# Patient Record
Sex: FEMALE | Race: BLACK OR AFRICAN AMERICAN | Employment: OTHER | URBAN - METROPOLITAN AREA
[De-identification: names, ages, dates, MRNs, and addresses within clinical notes are randomized per-mention and may not be internally consistent; named-entity substitution may affect disease eponyms.]

---

## 2017-01-17 ENCOUNTER — HOSPITAL ENCOUNTER (EMERGENCY)
Age: 67
Discharge: HOME | End: 2017-01-17
Attending: EMERGENCY MEDICINE
Payer: MEDICARE

## 2017-01-17 VITALS
HEIGHT: 63 IN | HEART RATE: 91 BPM | DIASTOLIC BLOOD PRESSURE: 110 MMHG | WEIGHT: 263.13 LBS | OXYGEN SATURATION: 100 % | SYSTOLIC BLOOD PRESSURE: 161 MMHG | BODY MASS INDEX: 46.62 KG/M2 | RESPIRATION RATE: 16 BRPM | TEMPERATURE: 98.6 F

## 2017-01-17 DIAGNOSIS — K04.7 DENTAL INFECTION: Primary | ICD-10-CM

## 2017-01-17 DIAGNOSIS — K08.89 PAIN, DENTAL: ICD-10-CM

## 2017-01-17 PROCEDURE — 74011250637 HC RX REV CODE- 250/637: Performed by: EMERGENCY MEDICINE

## 2017-01-17 PROCEDURE — 99282 EMERGENCY DEPT VISIT SF MDM: CPT

## 2017-01-17 RX ORDER — CLINDAMYCIN HYDROCHLORIDE 300 MG/1
300 CAPSULE ORAL 4 TIMES DAILY
Qty: 40 CAP | Refills: 0 | Status: SHIPPED | OUTPATIENT
Start: 2017-01-17 | End: 2017-01-27

## 2017-01-17 RX ORDER — CLINDAMYCIN HYDROCHLORIDE 150 MG/1
300 CAPSULE ORAL
Status: COMPLETED | OUTPATIENT
Start: 2017-01-17 | End: 2017-01-17

## 2017-01-17 RX ORDER — ACETAMINOPHEN 325 MG/1
650 TABLET ORAL ONCE
Status: COMPLETED | OUTPATIENT
Start: 2017-01-17 | End: 2017-01-17

## 2017-01-17 RX ADMIN — CLINDAMYCIN HYDROCHLORIDE 300 MG: 150 CAPSULE ORAL at 14:52

## 2017-01-17 RX ADMIN — ACETAMINOPHEN 650 MG: 325 TABLET, FILM COATED ORAL at 15:06

## 2017-01-17 NOTE — ED TRIAGE NOTES
Pt stated she has had intermittent gum pain and infection for 4 years, pt stated she does not have any teeth and she scratched her gum and then used a dirty tissue to wipe it, pt stated she has heat sweats at night, pt stated there is pus coming out, stated she needs an oral surgery , pt rambling in triage. ............... Justin Winslow

## 2017-01-17 NOTE — ED NOTES
Pt given discharge instructions, patient education, prescriptions, and follow up information. Pt states understanding. All questions answered. Pt discharged to home in private vehicle, ambulatory. Pt A/Ox4, RA, pain controlled.

## 2017-01-17 NOTE — ED PROVIDER NOTES
HPI Comments: 73 y.o. female with extensive past medical history, please see list, significant for bipolar, fibromyalgia, DM, HTN, and morbid obesity who presents from home with chief complaint of dental problem. Pt states that she has been experiencing worsening pain of the lower left gum line since 12/31/16 after she was scraping plaque off of a tooth and used a dirty tissue to wipe the area. Pt reports that she is experiencing pus draining from the area, and felt a \"pop\" last night, causing pus to drain continuously. Pt says that she delayed treatment at first because her sxs caused her to eat less, and the pt thought that this would help treat her HTN. Pt also reports some nausea, chills and a left sided HA. Pt denies experiencing fever or vomiting. There are no other acute medical concerns at this time. PCP: Farhat Helms MD    Note written by Nain Robledo. Kimberly Art, as dictated by Adelina Fulton MD 2:43 PM      The history is provided by the patient. No  was used.         Past Medical History:   Diagnosis Date    Arrhythmia      palpitations - evaluated and nothing to worry about per pt    Arthritis      fibromyalgia    Asthma     Chronic pain      osteoarthritis/?fibromyalgia    Diabetes ([address]     Gastrointestinal disorder      diverticulosis    Gastrointestinal disorder      chronic GI bleed    Hypertension     Morbid obesity ([address]     Other ill-defined conditions(799.89)      macular degeneration    Other ill-defined conditions(799.89)      phlebitis, DVTs    Other ill-defined conditions(799.89)      chronic post-menopausal vaginal bleeding    Other ill-defined conditions(799.89)      glaucoma    Other ill-defined conditions(799.89)      increased cholesterol    Other ill-defined conditions(799.89)      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 left foot    Unspecified adverse effect of anesthesia      wants light sedation       Past Surgical History:   Procedure Laterality Date    Hx tubal ligation           Family History:   Problem Relation Age of Onset    Heart Disease Mother     Obesity Mother     Heart Disease Sister     Heart Attack Sister     Heart Disease Brother     Heart Attack Maternal Uncle     Heart Disease Maternal Grandmother     Obesity Maternal Grandmother     Obesity Other     Diabetes Other     Cancer Other      ovarian    Heart Disease Other     Heart Disease Sister     Other Sister      AIDS    Heart Disease Sister     Other Sister      AIDS    Liver Disease Sister     Other Sister      aneurysm - brain       Social History     Social History    Marital status: SINGLE     Spouse name: N/A    Number of children: N/A    Years of education: N/A     Occupational History    Not on file. Social History Main Topics    Smoking status: Former Smoker     Types: Cigarettes    Smokeless tobacco: Not on file      Comment: 4-6 pack a day, quit 1972    Alcohol use 0.0 oz/week     0 Standard drinks or equivalent per week      Comment: rare    Drug use: No    Sexual activity: No     Other Topics Concern    Not on file     Social History Narrative         ALLERGIES: Penicillins; Barium sulfate; Glipizide; Iodinated contrast media - oral and iv dye; Laxative; Metformin; and Statins-hmg-coa reductase inhibitors    Review of Systems   Constitutional: Positive for chills. Negative for fever. HENT: Positive for dental problem. Negative for rhinorrhea and sore throat. Respiratory: Negative for cough and shortness of breath. Cardiovascular: Negative for chest pain. Gastrointestinal: Positive for nausea. Negative for abdominal pain, diarrhea and vomiting. Genitourinary: Negative for dysuria and urgency. Musculoskeletal: Negative for arthralgias and back pain. Skin: Negative for rash. Neurological: Positive for headaches (left sided). Negative for dizziness, weakness and light-headedness. All other systems reviewed and are negative. Vitals:    01/17/17 1403   BP: (!) 147/96   Pulse: 95   Resp: 16   Temp: 99.2 °F (37.3 °C)   SpO2: 94%   Weight: 119.4 kg (263 lb 2 oz)   Height: 5' 3\" (1.6 m)            Physical Exam   Const:  No acute distress, well developed, well nourished  Head:  Poor dentition. Some bleeding of the lower left gums. No obvious abscess or fluctuants. Eyes:  PERRL, conjunctiva normal, no scleral icterus  Neck:  Supple, trachea midline  Cardiovascular:  RRR, no murmurs, no gallops, no rubs  Resp:  No resp distress, no increased work of breathing, no wheezes, no rhonchi, no rales,  Abd:  Soft, non-tender, non-distended, no rebound, no guarding, no CVA tenderness  :  Deferred  MSK:  No pedal edema, normal ROM  Neuro:  Alert and oriented x3, no cranial nerve defect  Skin:  Warm, dry, intact  Psych: normal mood and affect, behavior is normal, judgement and thought content is normal  Note written by Gorge Maltese P. Seretha Schaumann, as dictated by Valerai Carbone MD 2:43 PM      MDM  Number of Diagnoses or Management Options  Dental infection:   Pain, dental:      Amount and/or Complexity of Data Reviewed  Clinical lab tests: ordered and reviewed  Tests in the radiology section of CPT®: ordered and reviewed  Review and summarize past medical records: yes    Patient Progress  Patient progress: stable    ED Course     Pt. Presents to the ER with dental pain and infection. No signs of abscess now. She has had purulent drainage. Exam not consistent with Tom's. I will start him on clindamycin with f/u with dentistry or return to the ER with worsening sx.       Procedures

## 2017-01-18 ENCOUNTER — PATIENT OUTREACH (OUTPATIENT)
Dept: FAMILY MEDICINE CLINIC | Age: 67
End: 2017-01-18

## 2017-01-18 NOTE — PROGRESS NOTES
Called pt as her financial screening  in 2016. She confirmed that she has another PCP that she goes to but can not remember the name of this provider. It is a provider that her daughter sees. Pt knows to call [facility] for FS to be renewed if she would like to return to see us.  Yash Kelley RN

## 2017-01-19 ENCOUNTER — PATIENT OUTREACH (OUTPATIENT)
Dept: FAMILY MEDICINE CLINIC | Age: 67
End: 2017-01-19

## 2017-01-20 ENCOUNTER — TELEPHONE (OUTPATIENT)
Dept: FAMILY MEDICINE CLINIC | Age: 67
End: 2017-01-20

## 2017-01-20 NOTE — TELEPHONE ENCOUNTER
Pt left message asking for \"yeast pill. \" I had called pt earlieron 1/17/17 and she confirmed that she was no longer a patient of [id number] [facility] I called pt back to remind of her this but there was no answer.    Analisa Hampton RN

## 2020-08-20 ENCOUNTER — HOSPITAL ENCOUNTER (OUTPATIENT)
Dept: CT IMAGING | Age: 70
Discharge: HOME | End: 2020-08-20
Attending: INTERNAL MEDICINE
Payer: MEDICARE

## 2020-08-20 DIAGNOSIS — K92.1 BLOOD IN STOOL: ICD-10-CM

## 2020-08-20 DIAGNOSIS — R10.32 ABDOMINAL PAIN, LEFT LOWER QUADRANT: ICD-10-CM

## 2020-08-20 PROCEDURE — 74011000258 HC RX REV CODE- 258: Performed by: RADIOLOGY

## 2020-08-20 PROCEDURE — 74177 CT ABD & PELVIS W/CONTRAST: CPT

## 2020-08-20 PROCEDURE — 82565 ASSAY OF CREATININE: CPT

## 2020-08-20 PROCEDURE — 74011000636 HC RX REV CODE- 636

## 2020-08-20 RX ORDER — SODIUM CHLORIDE 0.9 % (FLUSH) 0.9 %
10 SYRINGE (ML) INJECTION
Status: COMPLETED | OUTPATIENT
Start: 2020-08-20 | End: 2020-08-20

## 2020-08-20 RX ADMIN — SODIUM CHLORIDE 100 ML: 900 INJECTION, SOLUTION INTRAVENOUS at 18:00

## 2020-08-20 RX ADMIN — Medication 10 ML: at 18:00

## 2020-08-20 RX ADMIN — IOPAMIDOL 100 ML: 755 INJECTION, SOLUTION INTRAVENOUS at 17:00

## 2020-08-21 LAB — CREAT BLD-MCNC: 0.7 MG/DL (ref 0.6–1.3)

## 2022-05-05 ENCOUNTER — TRANSCRIBE ORDER (OUTPATIENT)
Dept: SCHEDULING | Age: 72
End: 2022-05-05

## 2022-05-05 DIAGNOSIS — R06.02 SOB (SHORTNESS OF BREATH): Primary | ICD-10-CM

## 2022-05-05 DIAGNOSIS — R07.89 CHEST DISCOMFORT: ICD-10-CM

## 2022-05-05 DIAGNOSIS — R94.31 ABNORMAL EKG: ICD-10-CM

## 2022-05-12 ENCOUNTER — HOSPITAL ENCOUNTER (OUTPATIENT)
Dept: NON INVASIVE DIAGNOSTICS | Age: 72
Discharge: HOME | End: 2022-05-12
Attending: INTERNAL MEDICINE
Payer: MEDICARE

## 2022-05-12 VITALS
DIASTOLIC BLOOD PRESSURE: 87 MMHG | BODY MASS INDEX: 46.64 KG/M2 | WEIGHT: 263.23 LBS | SYSTOLIC BLOOD PRESSURE: 170 MMHG | HEIGHT: 63 IN

## 2022-05-12 DIAGNOSIS — R94.31 ABNORMAL EKG: ICD-10-CM

## 2022-05-12 DIAGNOSIS — R06.02 SOB (SHORTNESS OF BREATH): ICD-10-CM

## 2022-05-12 DIAGNOSIS — R07.89 CHEST DISCOMFORT: ICD-10-CM

## 2022-05-12 PROCEDURE — 93306 TTE W/DOPPLER COMPLETE: CPT

## 2022-05-13 LAB
ECHO AO ASC DIAM: 3.6 CM
ECHO AO ASCENDING AORTA INDEX: 1.66 CM/M2
ECHO AO ROOT DIAM: 3.2 CM
ECHO AO ROOT INDEX: 1.47 CM/M2
ECHO AV MEAN GRADIENT: 3 MMHG
ECHO AV MEAN VELOCITY: 0.7 M/S
ECHO AV PEAK GRADIENT: 5 MMHG
ECHO AV PEAK VELOCITY: 1.2 M/S
ECHO AV VELOCITY RATIO: 0.67
ECHO AV VTI: 23.8 CM
ECHO EST RA PRESSURE: 3 MMHG
ECHO LA DIAMETER INDEX: 2.21 CM/M2
ECHO LA DIAMETER: 4.8 CM
ECHO LA TO AORTIC ROOT RATIO: 1.5
ECHO LV E' LATERAL VELOCITY: 8 CM/S
ECHO LV E' SEPTAL VELOCITY: 6 CM/S
ECHO LV EJECTION FRACTION BIPLANE: 54 % (ref 55–100)
ECHO LV FRACTIONAL SHORTENING: 34 % (ref 28–44)
ECHO LV INTERNAL DIMENSION DIASTOLE INDEX: 2.58 CM/M2
ECHO LV INTERNAL DIMENSION DIASTOLIC: 5.6 CM (ref 3.9–5.3)
ECHO LV INTERNAL DIMENSION SYSTOLIC INDEX: 1.71 CM/M2
ECHO LV INTERNAL DIMENSION SYSTOLIC: 3.7 CM
ECHO LV IVSD: 1 CM (ref 0.6–0.9)
ECHO LV MASS 2D: 205.5 G (ref 67–162)
ECHO LV MASS INDEX 2D: 94.7 G/M2 (ref 43–95)
ECHO LV POSTERIOR WALL DIASTOLIC: 0.9 CM (ref 0.6–0.9)
ECHO LV RELATIVE WALL THICKNESS RATIO: 0.32
ECHO LVOT AV VTI INDEX: 0.87
ECHO LVOT CARDIAC OUTPUT: 3.6 LITER/MINUTE
ECHO LVOT CARDIAC OUTPUT: 3.6 LITER/MINUTE
ECHO LVOT CARDIAC OUTPUT: 3.7 LITER/MINUTE
ECHO LVOT CARDIAC OUTPUT: 3.7 LITER/MINUTE
ECHO LVOT MEAN GRADIENT: 1 MMHG
ECHO LVOT PEAK GRADIENT: 3 MMHG
ECHO LVOT PEAK VELOCITY: 0.8 M/S
ECHO LVOT VTI: 20.6 CM
ECHO MV A VELOCITY: 0.61 M/S
ECHO MV E DECELERATION TIME (DT): 260.3 MS
ECHO MV E VELOCITY: 0.41 M/S
ECHO MV E/A RATIO: 0.67
ECHO MV E/E' LATERAL: 5.13
ECHO MV E/E' RATIO (AVERAGED): 5.98
ECHO MV E/E' SEPTAL: 6.83
ECHO PULMONARY ARTERY END DIASTOLIC PRESSURE: 14 MMHG
ECHO PV MAX VELOCITY: 1 M/S
ECHO PV PEAK GRADIENT: 4 MMHG
ECHO RIGHT VENTRICULAR SYSTOLIC PRESSURE (RVSP): 29 MMHG
ECHO RV TAPSE: 3 CM (ref 1.7–?)
ECHO TV REGURGITANT MAX VELOCITY: 2.56 M/S
ECHO TV REGURGITANT PEAK GRADIENT: 26 MMHG

## 2022-05-13 PROCEDURE — 93306 TTE W/DOPPLER COMPLETE: CPT | Performed by: SPECIALIST

## 2022-10-04 ENCOUNTER — TRANSCRIBE ORDER (OUTPATIENT)
Dept: SCHEDULING | Age: 72
End: 2022-10-04

## 2022-10-04 DIAGNOSIS — Z12.11 COLON CANCER SCREENING: Primary | ICD-10-CM

## 2022-10-04 DIAGNOSIS — E11.9 DIABETES MELLITUS TYPE 2, CONTROLLED (HCC): ICD-10-CM

## 2022-10-04 DIAGNOSIS — J44.9 COPD (CHRONIC OBSTRUCTIVE PULMONARY DISEASE) (HCC): ICD-10-CM

## 2022-10-04 DIAGNOSIS — N82.3 RECTOVAGINAL FISTULA: ICD-10-CM

## 2022-10-04 DIAGNOSIS — K59.04 CHRONIC IDIOPATHIC CONSTIPATION: ICD-10-CM

## 2022-10-04 DIAGNOSIS — R10.13 EPIGASTRIC PAIN: ICD-10-CM

## 2023-05-17 RX ORDER — SPIRONOLACTONE 25 MG/1
25 TABLET ORAL DAILY
COMMUNITY
Start: 2015-04-23

## 2023-05-17 RX ORDER — POTASSIUM CHLORIDE 750 MG/1
10 TABLET, FILM COATED, EXTENDED RELEASE ORAL DAILY
COMMUNITY

## 2023-05-17 RX ORDER — FUROSEMIDE 20 MG/1
20 TABLET ORAL DAILY
COMMUNITY
Start: 2016-05-16

## 2023-05-17 RX ORDER — ASPIRIN 81 MG/1
81 TABLET, CHEWABLE ORAL PRN
COMMUNITY

## 2023-05-17 RX ORDER — DIPHENHYDRAMINE HCL 25 MG
25 CAPSULE ORAL PRN
COMMUNITY